# Patient Record
Sex: FEMALE | Race: ASIAN | NOT HISPANIC OR LATINO | Employment: FULL TIME | URBAN - METROPOLITAN AREA
[De-identification: names, ages, dates, MRNs, and addresses within clinical notes are randomized per-mention and may not be internally consistent; named-entity substitution may affect disease eponyms.]

---

## 2018-09-03 ENCOUNTER — TRANSFERRED RECORDS (OUTPATIENT)
Dept: HEALTH INFORMATION MANAGEMENT | Facility: CLINIC | Age: 23
End: 2018-09-03

## 2018-09-04 ENCOUNTER — COMMUNICATION - HEALTHEAST (OUTPATIENT)
Dept: ADMINISTRATIVE | Facility: CLINIC | Age: 23
End: 2018-09-04

## 2018-09-04 ENCOUNTER — HOSPITAL ENCOUNTER (OUTPATIENT)
Facility: CLINIC | Age: 23
Setting detail: OBSERVATION
Discharge: HOME | End: 2018-09-05
Attending: INTERNAL MEDICINE | Admitting: INTERNAL MEDICINE
Payer: MEDICAID

## 2018-09-04 DIAGNOSIS — E05.90 HYPERTHYROIDISM: Primary | ICD-10-CM

## 2018-09-04 LAB
ALBUMIN SERPL-MCNC: 2.7 G/DL (ref 3.4–5)
ALP SERPL-CCNC: 146 U/L (ref 40–150)
ALT SERPL W P-5'-P-CCNC: 39 U/L (ref 0–50)
ALT SERPL-CCNC: 32 U/L (ref 0–45)
ANION GAP SERPL CALCULATED.3IONS-SCNC: 7 MMOL/L (ref 3–14)
AST SERPL W P-5'-P-CCNC: 32 U/L (ref 0–45)
AST SERPL-CCNC: 26 U/L (ref 0–40)
BASOPHILS # BLD AUTO: 0 10E9/L (ref 0–0.2)
BASOPHILS NFR BLD AUTO: 0 %
BILIRUB SERPL-MCNC: 0.8 MG/DL (ref 0.2–1.3)
BUN SERPL-MCNC: 8 MG/DL (ref 7–30)
CALCIUM SERPL-MCNC: 9 MG/DL (ref 8.5–10.1)
CHLORIDE SERPL-SCNC: 114 MMOL/L (ref 94–109)
CO2 SERPL-SCNC: 20 MMOL/L (ref 20–32)
CREAT SERPL-MCNC: 0.43 MG/DL (ref 0.52–1.04)
CREAT SERPL-MCNC: 0.55 MG/DL (ref 0.6–1.1)
DIFFERENTIAL METHOD BLD: ABNORMAL
EOSINOPHIL # BLD AUTO: 0 10E9/L (ref 0–0.7)
EOSINOPHIL NFR BLD AUTO: 1 %
ERYTHROCYTE [DISTWIDTH] IN BLOOD BY AUTOMATED COUNT: 12.3 % (ref 10–15)
GFR SERPL CREATININE-BSD FRML MDRD: >60 ML/MIN/1.73M2
GFR SERPL CREATININE-BSD FRML MDRD: >90 ML/MIN/1.7M2
GLUCOSE SERPL-MCNC: 72 MG/DL (ref 70–99)
GLUCOSE SERPL-MCNC: 83 MG/DL (ref 70–125)
HCT VFR BLD AUTO: 37.7 % (ref 35–47)
HGB BLD-MCNC: 12.5 G/DL (ref 11.7–15.7)
IMM GRANULOCYTES # BLD: 0 10E9/L (ref 0–0.4)
IMM GRANULOCYTES NFR BLD: 0 %
INR PPP: 1.2 (ref 0.86–1.14)
IRON SATN MFR SERPL: 23 % (ref 15–46)
IRON SERPL-MCNC: 56 UG/DL (ref 35–180)
LACTATE BLD-SCNC: 0.9 MMOL/L (ref 0.7–2)
LYMPHOCYTES # BLD AUTO: 0.9 10E9/L (ref 0.8–5.3)
LYMPHOCYTES NFR BLD AUTO: 31.1 %
MAGNESIUM SERPL-MCNC: 1.7 MG/DL (ref 1.6–2.3)
MCH RBC QN AUTO: 26 PG (ref 26.5–33)
MCHC RBC AUTO-ENTMCNC: 33.2 G/DL (ref 31.5–36.5)
MCV RBC AUTO: 78 FL (ref 78–100)
MONOCYTES # BLD AUTO: 0.4 10E9/L (ref 0–1.3)
MONOCYTES NFR BLD AUTO: 13.7 %
NEUTROPHILS # BLD AUTO: 1.6 10E9/L (ref 1.6–8.3)
NEUTROPHILS NFR BLD AUTO: 54.2 %
NRBC # BLD AUTO: 0 10*3/UL
NRBC BLD AUTO-RTO: 0 /100
PHOSPHATE SERPL-MCNC: 4.6 MG/DL (ref 2.5–4.5)
PLATELET # BLD AUTO: 126 10E9/L (ref 150–450)
POTASSIUM SERPL-SCNC: 3.9 MMOL/L (ref 3.5–5)
POTASSIUM SERPL-SCNC: 4 MMOL/L (ref 3.4–5.3)
PROT SERPL-MCNC: 6.4 G/DL (ref 6.8–8.8)
RBC # BLD AUTO: 4.81 10E12/L (ref 3.8–5.2)
RETICS # AUTO: 55.2 10E9/L (ref 25–95)
RETICS/RBC NFR AUTO: 1.2 % (ref 0.5–2)
SODIUM SERPL-SCNC: 142 MMOL/L (ref 133–144)
T3FREE SERPL-MCNC: 12 PG/ML (ref 2.3–4.2)
TIBC SERPL-MCNC: 241 UG/DL (ref 240–430)
TSH SERPL-ACNC: <0.01 UIU/ML (ref 0.3–5)
WBC # BLD AUTO: 2.9 10E9/L (ref 4–11)

## 2018-09-04 PROCEDURE — 40000141 ZZH STATISTIC PERIPHERAL IV START W/O US GUIDANCE

## 2018-09-04 PROCEDURE — 36415 COLL VENOUS BLD VENIPUNCTURE: CPT | Performed by: PHYSICIAN ASSISTANT

## 2018-09-04 PROCEDURE — 40000611 ZZHCL STATISTIC MORPHOLOGY W/INTERP HEMEPATH TC 85060: Performed by: PHYSICIAN ASSISTANT

## 2018-09-04 PROCEDURE — 99223 1ST HOSP IP/OBS HIGH 75: CPT | Mod: AI | Performed by: HOSPITALIST

## 2018-09-04 PROCEDURE — 83605 ASSAY OF LACTIC ACID: CPT | Performed by: HOSPITALIST

## 2018-09-04 PROCEDURE — 84481 FREE ASSAY (FT-3): CPT | Performed by: PHYSICIAN ASSISTANT

## 2018-09-04 PROCEDURE — 99207 ZZC APP CREDIT; MD BILLING SHARED VISIT: CPT | Performed by: PHYSICIAN ASSISTANT

## 2018-09-04 PROCEDURE — 85025 COMPLETE CBC W/AUTO DIFF WBC: CPT | Performed by: PHYSICIAN ASSISTANT

## 2018-09-04 PROCEDURE — 83540 ASSAY OF IRON: CPT | Performed by: PHYSICIAN ASSISTANT

## 2018-09-04 PROCEDURE — 80053 COMPREHEN METABOLIC PANEL: CPT | Performed by: PHYSICIAN ASSISTANT

## 2018-09-04 PROCEDURE — 12000001 ZZH R&B MED SURG/OB UMMC

## 2018-09-04 PROCEDURE — 84445 ASSAY OF TSI GLOBULIN: CPT | Performed by: PHYSICIAN ASSISTANT

## 2018-09-04 PROCEDURE — 25000132 ZZH RX MED GY IP 250 OP 250 PS 637: Performed by: PHYSICIAN ASSISTANT

## 2018-09-04 PROCEDURE — 25000128 H RX IP 250 OP 636: Performed by: PHYSICIAN ASSISTANT

## 2018-09-04 PROCEDURE — 83735 ASSAY OF MAGNESIUM: CPT | Performed by: PHYSICIAN ASSISTANT

## 2018-09-04 PROCEDURE — 83550 IRON BINDING TEST: CPT | Performed by: PHYSICIAN ASSISTANT

## 2018-09-04 PROCEDURE — 84100 ASSAY OF PHOSPHORUS: CPT | Performed by: PHYSICIAN ASSISTANT

## 2018-09-04 PROCEDURE — 85610 PROTHROMBIN TIME: CPT | Performed by: PHYSICIAN ASSISTANT

## 2018-09-04 PROCEDURE — 85045 AUTOMATED RETICULOCYTE COUNT: CPT | Performed by: PHYSICIAN ASSISTANT

## 2018-09-04 PROCEDURE — 36415 COLL VENOUS BLD VENIPUNCTURE: CPT | Performed by: HOSPITALIST

## 2018-09-04 RX ORDER — ATENOLOL 25 MG/1
25 TABLET ORAL DAILY
Status: DISCONTINUED | OUTPATIENT
Start: 2018-09-04 | End: 2018-09-05

## 2018-09-04 RX ORDER — SODIUM CHLORIDE 9 MG/ML
INJECTION, SOLUTION INTRAVENOUS CONTINUOUS
Status: DISCONTINUED | OUTPATIENT
Start: 2018-09-04 | End: 2018-09-04

## 2018-09-04 RX ORDER — PANTOPRAZOLE SODIUM 40 MG/1
40 TABLET, DELAYED RELEASE ORAL
Status: DISCONTINUED | OUTPATIENT
Start: 2018-09-04 | End: 2018-09-05 | Stop reason: HOSPADM

## 2018-09-04 RX ORDER — BISACODYL 5 MG
5 TABLET, DELAYED RELEASE (ENTERIC COATED) ORAL DAILY PRN
Status: DISCONTINUED | OUTPATIENT
Start: 2018-09-04 | End: 2018-09-05 | Stop reason: HOSPADM

## 2018-09-04 RX ORDER — NICOTINE POLACRILEX 4 MG
15-30 LOZENGE BUCCAL
Status: DISCONTINUED | OUTPATIENT
Start: 2018-09-04 | End: 2018-09-05 | Stop reason: HOSPADM

## 2018-09-04 RX ORDER — SODIUM CHLORIDE, SODIUM LACTATE, POTASSIUM CHLORIDE, CALCIUM CHLORIDE 600; 310; 30; 20 MG/100ML; MG/100ML; MG/100ML; MG/100ML
INJECTION, SOLUTION INTRAVENOUS CONTINUOUS
Status: DISCONTINUED | OUTPATIENT
Start: 2018-09-04 | End: 2018-09-05

## 2018-09-04 RX ORDER — BISACODYL 5 MG
15 TABLET, DELAYED RELEASE (ENTERIC COATED) ORAL DAILY PRN
Status: DISCONTINUED | OUTPATIENT
Start: 2018-09-04 | End: 2018-09-05 | Stop reason: HOSPADM

## 2018-09-04 RX ORDER — ACETAMINOPHEN 325 MG/1
975 TABLET ORAL EVERY 8 HOURS PRN
Status: DISCONTINUED | OUTPATIENT
Start: 2018-09-04 | End: 2018-09-05

## 2018-09-04 RX ORDER — NALOXONE HYDROCHLORIDE 0.4 MG/ML
.1-.4 INJECTION, SOLUTION INTRAMUSCULAR; INTRAVENOUS; SUBCUTANEOUS
Status: DISCONTINUED | OUTPATIENT
Start: 2018-09-04 | End: 2018-09-05 | Stop reason: HOSPADM

## 2018-09-04 RX ORDER — DEXTROSE MONOHYDRATE 25 G/50ML
25-50 INJECTION, SOLUTION INTRAVENOUS
Status: DISCONTINUED | OUTPATIENT
Start: 2018-09-04 | End: 2018-09-05 | Stop reason: HOSPADM

## 2018-09-04 RX ORDER — AMOXICILLIN 250 MG
2 CAPSULE ORAL 2 TIMES DAILY
Status: DISCONTINUED | OUTPATIENT
Start: 2018-09-04 | End: 2018-09-05 | Stop reason: HOSPADM

## 2018-09-04 RX ORDER — ONDANSETRON 4 MG/1
4 TABLET, ORALLY DISINTEGRATING ORAL EVERY 6 HOURS PRN
Status: DISCONTINUED | OUTPATIENT
Start: 2018-09-04 | End: 2018-09-05 | Stop reason: HOSPADM

## 2018-09-04 RX ORDER — BISACODYL 5 MG
10 TABLET, DELAYED RELEASE (ENTERIC COATED) ORAL DAILY PRN
Status: DISCONTINUED | OUTPATIENT
Start: 2018-09-04 | End: 2018-09-05 | Stop reason: HOSPADM

## 2018-09-04 RX ORDER — DEXTROSE, SODIUM CHLORIDE, SODIUM LACTATE, POTASSIUM CHLORIDE, AND CALCIUM CHLORIDE 5; .6; .31; .03; .02 G/100ML; G/100ML; G/100ML; G/100ML; G/100ML
500 INJECTION, SOLUTION INTRAVENOUS ONCE
Status: COMPLETED | OUTPATIENT
Start: 2018-09-04 | End: 2018-09-04

## 2018-09-04 RX ORDER — LIDOCAINE 40 MG/G
CREAM TOPICAL
Status: DISCONTINUED | OUTPATIENT
Start: 2018-09-04 | End: 2018-09-05 | Stop reason: HOSPADM

## 2018-09-04 RX ORDER — AMOXICILLIN 250 MG
1 CAPSULE ORAL 2 TIMES DAILY
Status: DISCONTINUED | OUTPATIENT
Start: 2018-09-04 | End: 2018-09-05 | Stop reason: HOSPADM

## 2018-09-04 RX ORDER — ONDANSETRON 2 MG/ML
4 INJECTION INTRAMUSCULAR; INTRAVENOUS EVERY 6 HOURS PRN
Status: DISCONTINUED | OUTPATIENT
Start: 2018-09-04 | End: 2018-09-05 | Stop reason: HOSPADM

## 2018-09-04 RX ADMIN — ONDANSETRON 4 MG: 2 INJECTION INTRAMUSCULAR; INTRAVENOUS at 20:23

## 2018-09-04 RX ADMIN — ACETAMINOPHEN 975 MG: 325 TABLET, FILM COATED ORAL at 20:12

## 2018-09-04 RX ADMIN — SODIUM CHLORIDE, SODIUM LACTATE, POTASSIUM CHLORIDE, CALCIUM CHLORIDE AND DEXTROSE MONOHYDRATE 500 ML: 5; 600; 310; 30; 20 INJECTION, SOLUTION INTRAVENOUS at 20:13

## 2018-09-04 RX ADMIN — ATENOLOL 25 MG: 25 TABLET ORAL at 22:15

## 2018-09-04 RX ADMIN — SODIUM CHLORIDE, POTASSIUM CHLORIDE, SODIUM LACTATE AND CALCIUM CHLORIDE: 600; 310; 30; 20 INJECTION, SOLUTION INTRAVENOUS at 17:30

## 2018-09-04 ASSESSMENT — PAIN DESCRIPTION - DESCRIPTORS: DESCRIPTORS: HEADACHE

## 2018-09-04 ASSESSMENT — ACTIVITIES OF DAILY LIVING (ADL)
ADLS_ACUITY_SCORE: 17
ADLS_ACUITY_SCORE: 17

## 2018-09-04 NOTE — PROGRESS NOTES
[facility]  Transfer Triage Note    Date of call: 09/04/18  Time of call: 11:16 AM    Reason for Transfer:Further diagnostic work up, management, and consultation for specialized care  Diagnosis: Hyperthyroidism    Outside Records: Available  Additional records requested to be faxed to [phone].    Stability of Patient: Patient is vitally stable, with no critical labs, and will likely remain stable throughout the transfer process    Expected Time of Arrival for Transfer: 0-8 hours    Recommendations for Management and Stabilization: Not needed    Additional Comments   24 yo previously healthy female presented to [facility] ED with few week history of constitutional symptoms including fatigue, dark stools, low grade temps, palpitations. W/u revealed mostly normal labs however TSH is 0; T3 T4 levels are pending. Vitals: BP is wnl, Tachy in 110s. Non toxic appearing. Mental status is normal. Received propranolol and 2L fluid in the ED. Being transferred for evaluation by Endocrine here. They were consulted by ED over the phone prior to triage call and recommended transfer.    Kianna HILARIO MD  Triage Hospitalist

## 2018-09-04 NOTE — H&P
"[facility]    Internal Medicine History and Physical - Gold Service       Date of Admission:  9/4/2018    Assessment & Plan   Florentin Vega is a 23 year old female  admitted on 9/4/2018. She has PMH of Migraine w/o aura who presents in transfer from OSH ED where she presented with ~ 1 week hx of fatigue, dark stools, early satiety, low grade temps, and palpitations. OSH evaluation reveals new dx of hyperthyroidism, prompting transfer for Endocrine consultation. Patient admitted to Medicine for further care.     # Newly dx Hyperthyroidism: Patient presented to OSH ED with ~ 1 week hx of fatigue, headache, tremor in UE and LE, palpitations, dark stool, suprapubic pain, dysuria, nausea. Also reports ~ 10-15 pound unintentional weight loss in past couple months and hot flashes. ED w/u with HR in 120's, /73, RR 25. EKG with sinus tachycardia. TSH of less than 0.01, T4 3.1 and T3 229, leukopenia, thrombocytopenia. LFT's wnl, Glucose 83. FOBT negative, Hgb stable at 13.2. Non-contrast CT A/P w/o acute pathology. No chronic PTA medications. Has been taking ibuprofen and tylenol for the past week. Received 2L IVF, Toradol, Zofran, and Propranolol. Will likely need GOMEZ uptake study to further evaluate. No nodule or goiter on exam. , BP stable. No opthalmopathy, or per-tibial myxedema on exam.   - Endocrine consult placed. Discussed with this evening and plan for symptomatic treatment overnight. They will see patient in am   - Add TSI- Thyroid stimulating immunoglobulin  - CBC, CMP, UA, Mg, Phos  - IVF  - Zofran PRN for nausea  - Tylenol for pain  - Regular diet okay    # Suprapubic pain and dysuria: Sx present for past week. Also reports associated \"spotting\" on 9/2/18. Pain is dull and intermittent. UA and Urnie Hcg negative at OSH. Lipase, LFT's, Tbili- wnl. Mildly elevated . Non-contrast CT A/P w/o acute pathology. Possibly related to hyperthyroidism and polyuria. "   - Heating pad  - Further recs pending w/u on admission     # Microcytosis: Hgb 12.5, MCV 76. Reports recent heavy menstruation.   - Add Iron studis      # Leukopenia: WBC 3.4 at OSH. On admission 2.9. ANC 1.6. Most recent WBC 8.0 3/2015.   - Peripheral smear    # Thrombocytopenia: Platelets 126 on admission. 3/2015 platelets 268. Consider drug induced with recent ibuprofen use vs ITP w/ hyperthyroid dx.   - Peripheral smear  - CBC in am     # Migraine w/o aura: Since ~ 2010, though has not experienced HA in past 7 years. Reports significant intractable HA for past week that is changed from previous. Pain is located bi-temporal, between eyes and top of head. Patient has karlo taking tylenol and ibuprofen with mild alleviation in sx.   - Tylenol  - Avoid NSAIDS for now as patient concerned for developing PUD        # Pain Assessment:   - Florentin is experiencing pain due to suprapubic abdominal pain. Pain management was discussed and the plan was created in a collaborative fashion.  Florentin's response to the current recommendations: engaged  - Please see the plan for pain management as documented above        Diet: Regular Diet Adult  Fluids: LR @ 100 ml/hr  DVT Prophylaxis: Pneumatic Compression Devices  Code Status: Full Code    Disposition Plan   Expected discharge: 2 - 3 days; recommended to prior living arrangement once Endocrine cosult with possible intervention.     Entered: Libia Martinez 09/04/2018, 5:19 PM   Information in the above section will display in the discharge planner report.    The patient's care was discussed with the Attending Physician, Dr. Mg.    Libia Martinez PA-C  Internal Medicine Hospitalist Service  [facility]  Pager: [phone]    Please see sticky note for cross cover information  ______________________________________________________________________    Chief Complaint   Fatigue, palpitations, HA    History is obtained from the patient and  "EMR    History of Present Illness   Florentin Vega is a 23 year old female  With PMH of Migraine w/o aura who presented to OSH ED with ~ 1 week hx of fatigue, HA, dark stool, suprapubic pain and palpitations.     Patient reports that she has been in BL state of health until ~ 1 week ago when she developed extreme fatigue, intractable HA, nausea, palpitations, extreme hunger despite increased PO intake, dark stools, UE and LE tremor, anxiety and suprapubic pain. She has been taking tylenol and ibuprofen for the HA with mild relief. HA is reported as bi temporal w/ associated pain in between eyes and on top of head. Not similar to previous migraine HA. No vision changes, fever, or focal neurological changes.  Her appetite has significantly increased, though reports unintentional weight loss of 10-15 lbs in the past month or so and never feeling like she is full. Additionally, patient endorses dark stool (x 3 episodes- formed and no BRB, suprapubic pain and dysuria w/ small amount of \"spotting\". No hx of PUD, though has been taking large doses of ibuprofen for last week. No orthostatic sx.   Patient reports that she recently started back to College and with increase in stress, but does not think sx are related. She also reports that ~ 1 year ago she developed slight tremor in UE and LE, hot flashes, and anxiety. She did not seek medical attention for sx and has not taken any medication to alleviate sx. We discussed that she may have had mild sx related to Hyperthyroidism at that time and acute change in sx related to stress vs other. No family hx of thyroid disease. No other significant PMH or PTA medications.     Currently, patient very concerned about test results. We discussed plan as outlined above and patient agreeable.     Patient does not endorse:  changes in vision, chest pain,  upper respiratory symptoms of rhinorrhea or congestion, shortness of breath, cough, sputum production, wheezing, emesis, constipation, " diarrhea, dysuria, edema, rashes, weakness, focal neurologic deficits, recent travel, illness, fever, chills.      Review of Systems   The 10 point Review of Systems is negative other than noted in the HPI or here.     Past Medical History    I have reviewed this patient's medical history and updated it with pertinent information if needed.   Past Medical History:   Diagnosis Date     Chronic migraine w/o aura w/o status migrainosus, not intractable      Hyperthyroidism 09/04/2018        Past Surgical History   I have reviewed this patient's surgical history and updated it with pertinent information if needed.  No past surgical history on file.     Social History   Social History   Substance Use Topics     Smoking status: Never Smoker     Smokeless tobacco: Not on file     Alcohol use No       Family History   I have reviewed this patient's family history and updated it with pertinent information if needed.   Family History   Problem Relation Age of Onset     No Known Problems Mother      No Known Problems Father      No Known Problems Sister      No Known Problems Brother      No Known Problems Maternal Grandmother      No Known Problems Maternal Grandfather      No Known Problems Paternal Grandmother      No Known Problems Other        Prior to Admission Medications   None     Allergies   No Known Allergies    Physical Exam   Vital Signs: Temp: 96.6  F (35.9  C) Temp src: Oral BP: 117/81 Pulse: 107   Resp: 16 SpO2: 99 % O2 Device: None (Room air)    Weight: 117 lbs 4.8 oz    Physical Exam   Constitutional: Pleasant young female lying in bed. Friend at bedside. Soft spoken, though conversant.   Well nourished, well developed, resting comfortably   HEENT:   Head: Normocephalic and atraumatic.   Eyes: Conjunctivae are normal. Pupils are equal, round, and reactive to light.  Pharynx has no erythema or exudate, mucous membranes are moist  Neck:   No adenopathy, no bony tenderness. No goiter or nodule noted.    Cardiovascular: Tachycardia, though without murmurs or gallops  Pulmonary/Chest: Clear to auscultation bilaterally, with no wheezes or retractions. No respiratory distress.  GI: Soft with good bowel sounds. Mild TTP in suprapubic RLQ non-distended, with no guarding, no rebound, no peritoneal signs.   Back:  No bony or CVA tenderness   Musculoskeletal:  No edema or clubbing   Skin: Skin is warm and dry. No rash noted to exposed skin areas.   Neurological: Alert and oriented to person, place, and time. Nonfocal exam  Psychiatric:  Normal mood and affect.    Data   Data reviewed today: I reviewed all medications, new labs and imaging results over the last 24 hours. I personally reviewed recent imaging impressions, labs and progress notes from Care everywhere record.     Data     Recent Labs  Lab 09/04/18  1618   WBC 2.9*   HGB 12.5   MCV 78   *      POTASSIUM 4.0   CHLORIDE 114*   CO2 20   BUN 8   CR 0.43*   ANIONGAP 7   ALEJANDRA 9.0   GLC 72   ALBUMIN 2.7*   PROTTOTAL 6.4*   BILITOTAL 0.8   ALKPHOS 146   ALT 39   AST 32

## 2018-09-04 NOTE — IP AVS SNAPSHOT
MRN:[id number]                      After Visit Summary   9/4/2018    Florentin Vega    MRN: [id number]           Thank you!     Thank you for choosing [facility] for your care. Our goal is always to provide you with excellent care. Hearing back from our patients is one way we can continue to improve our services. Please take a few minutes to complete the written survey that you may receive in the mail after you visit with us. Thank you!        Patient Information     Date Of Birth          1995        Designated Caregiver       Most Recent Value    Caregiver    Will someone help with your care after discharge? no      About your hospital stay     You were admitted on:  September 4, 2018 You last received care in the:  [address] [facility]    You were discharged on:  September 5, 2018        Reason for your hospital stay       Admitted with hyperthyroidism and endocrinology consult                  Who to Call     For medical emergencies, please call 911.  For non-urgent questions about your medical care, please call your primary care provider or clinic, None          Attending Provider     Provider Specialty    Kianna Ayon MD Internal Medicine    Stella Mg MD Internal Medicine    Kaylen Valadez MD Internal Medicine       Primary Care Provider Fax #    Physician No Ref-Primary [phone]      After Care Instructions     Activity       Your activity upon discharge: activity as tolerated            Diet       Follow this diet upon discharge: Orders Placed This Encounter      Regular Diet Adult                  Follow-up Appointments     Adult [facility] Follow-up and recommended labs and tests       Follow up with primary care provider, Physician No Ref-Primary, within 7 days to evaluate medication change. Follow up for CBC is recommended  Follow up with endocrinology for next week with result of radiodine uptake scan   Follow up for radioiodine uptake scan on 9/6 and 9/7  "  Appointments on [place] and/or [facility] (with [facility] or [facility] provider or service). Call [phone] if you haven't heard regarding these appointments within 7 days of discharge.            Follow Up and recommended labs and tests       Follow up CBC in 1 week                  Additional Services     Endocrinology Adult Referral       Please have follow up with Dr Malone on 9/10                  Future tests that were ordered for you     NM Thyroid uptake and scan       For further evaluation of hyperthyroidism thyroid uptake scan ; please schedule for 9/6/2018                  Pending Results     Date and Time Order Name Status Description    9/5/2018 1131 Erythrocyte sedimentation rate auto In process     9/5/2018 1122 Haptoglobin In process     9/5/2018 0821 CMV DNA quantification In process     9/5/2018 0821 Adenovirus DNA QT PCR In process     9/5/2018 0821 Roseann Barr Virus Qualitative PCR In process     9/4/2018 1618 Thyroid stimulating immunoglobulin In process             Statement of Approval     Ordered          09/05/18 3549  I have reviewed and agree with all the recommendations and orders detailed in this document.  EFFECTIVE NOW     Approved and electronically signed by:  Kaylen Valadez MD             Admission Information     Date & Time Provider Department Dept. Phone    9/4/2018 Kaylen Valadez MD [address] [facility] [phone]      Your Vitals Were     Blood Pressure Pulse Temperature Respirations Height Weight    117/77 (BP Location: Left arm) 105 98.3  F (36.8  C) (Oral) 14 1.575 m (5' 2\") 53.2 kg (117 lb 4.8 oz)    Pulse Oximetry BMI (Body Mass Index)                96% 21.45 kg/m2          [facility] Information     [facility] lets you send messages to your doctor, view your test results, renew your prescriptions, schedule appointments and more. To sign up, go to [email or web address] . Click on \"Log in\" on the left side of the screen, which will take you to the Welcome " "page. Then click on \"Sign up Now\" on the right side of the page.     You will be asked to enter the access code listed below, as well as some personal information. Please follow the directions to create your username and password.     Your access code is: [email or web address]  Expires: 2018  6:26 PM     Your access code will  in 90 days. If you need help or a new code, please call your [facility] clinic or [phone].        Care EveryWhere ID     This is your Care EveryWhere ID. This could be used by other organizations to access your [facility] medical records  [id number]        Equal Access to Services     ABDI DEMPSEY : Theodore Collazo, nnamdi oliveira, jaleesa rao, jd barrientos. So [facility] [phone].    ATENCIÓN: Si habla español, tiene a treadwell disposición servicios gratuitos de asistencia lingüística. Llame al [phone].    We comply with applicable federal civil rights laws and [place] laws. We do not discriminate on the basis of race, color, national origin, age, disability, sex, sexual orientation, or gender identity.               Review of your medicines      START taking        Dose / Directions    propranolol 10 MG tablet   Commonly known as:  INDERAL        Dose:  10 mg   Take 1 tablet (10 mg) by mouth 3 times daily   Quantity:  60 tablet   Refills:  0         CONTINUE these medicines which have NOT CHANGED        Dose / Directions    acetaminophen 325 MG tablet   Commonly known as:  TYLENOL        Dose:  325-650 mg   Take 325-650 mg by mouth every 6 hours as needed for mild pain   Refills:  0       ibuprofen 200 MG tablet   Commonly known as:  ADVIL/MOTRIN        Dose:  200-400 mg   Take 200-400 mg by mouth every 4 hours as needed for mild pain   Refills:  0            Where to get your medicines      These medications were sent to [facility] [place], [place] - [address]  [address] [address] "     Phone:  [phone]     propranolol 10 MG tablet                Protect others around you: Learn how to safely use, store and throw away your medicines at www.disposemymeds.org.             Medication List: This is a list of all your medications and when to take them. Check marks below indicate your daily home schedule. Keep this list as a reference.      Medications           Morning Afternoon Evening Bedtime As Needed    acetaminophen 325 MG tablet   Commonly known as:  TYLENOL   Take 325-650 mg by mouth every 6 hours as needed for mild pain   Last time this was given:  975 mg on 9/4/2018  8:12 PM                                ibuprofen 200 MG tablet   Commonly known as:  ADVIL/MOTRIN   Take 200-400 mg by mouth every 4 hours as needed for mild pain                                propranolol 10 MG tablet   Commonly known as:  INDERAL   Take 1 tablet (10 mg) by mouth 3 times daily

## 2018-09-04 NOTE — LETTER
[address] [facility]  [address] [place] [address]  [phone]          September 5, 2018    RE:  Florentin HAZEL Her                                                                                                                                                       [address]  [place] [address]            To whom it may concern:    Florentin Vega is under my professional care for Hyperthyroidism . She was admitted in [facility] and [facility] from 9/3/2018 to 9/5/2018 for above reasons. She would need further clinic visit for above reasons after her hospital stay till 9/11/2018 and she is ok to go back to work after that with no limitations.   Please let us know if any further questions.     Sincerely,  Dr Kaylen Valadez MD    Department of General Internal Medicine   [facility]   Phone: [phone]

## 2018-09-04 NOTE — IP AVS SNAPSHOT
[address] [address]    Phone:  [phone]                                       After Visit Summary   9/4/2018    Florentin Vega    MRN: [id number]           After Visit Summary Signature Page     I have received my discharge instructions, and my questions have been answered. I have discussed any challenges I see with this plan with the nurse or doctor.    ..........................................................................................................................................  Patient/Patient Representative Signature      ..........................................................................................................................................  Patient Representative Print Name and Relationship to Patient    ..................................................               ................................................  Date                                            Time    ..........................................................................................................................................  Reviewed by Signature/Title    ...................................................              ..............................................  Date                                                            Time          22EPIC Rev 08/18

## 2018-09-05 ENCOUNTER — APPOINTMENT (OUTPATIENT)
Dept: CARDIOLOGY | Facility: CLINIC | Age: 23
End: 2018-09-05
Attending: INTERNAL MEDICINE
Payer: MEDICAID

## 2018-09-05 VITALS
RESPIRATION RATE: 14 BRPM | SYSTOLIC BLOOD PRESSURE: 117 MMHG | TEMPERATURE: 98.3 F | HEIGHT: 62 IN | OXYGEN SATURATION: 96 % | BODY MASS INDEX: 21.59 KG/M2 | DIASTOLIC BLOOD PRESSURE: 77 MMHG | HEART RATE: 105 BPM | WEIGHT: 117.3 LBS

## 2018-09-05 LAB
ALBUMIN SERPL-MCNC: 2.6 G/DL (ref 3.4–5)
ALBUMIN SERPL-MCNC: 2.7 G/DL (ref 3.4–5)
ALBUMIN UR-MCNC: NEGATIVE MG/DL
ALP SERPL-CCNC: 141 U/L (ref 40–150)
ALP SERPL-CCNC: 161 U/L (ref 40–150)
ALT SERPL W P-5'-P-CCNC: 40 U/L (ref 0–50)
ALT SERPL W P-5'-P-CCNC: 46 U/L (ref 0–50)
ANION GAP SERPL CALCULATED.3IONS-SCNC: 6 MMOL/L (ref 3–14)
APPEARANCE UR: CLEAR
AST SERPL W P-5'-P-CCNC: 26 U/L (ref 0–45)
AST SERPL W P-5'-P-CCNC: 34 U/L (ref 0–45)
BASOPHILS # BLD AUTO: 0 10E9/L (ref 0–0.2)
BASOPHILS NFR BLD AUTO: 0 %
BILIRUB DIRECT SERPL-MCNC: 0.1 MG/DL (ref 0–0.2)
BILIRUB SERPL-MCNC: 0.3 MG/DL (ref 0.2–1.3)
BILIRUB SERPL-MCNC: 0.6 MG/DL (ref 0.2–1.3)
BILIRUB UR QL STRIP: NEGATIVE
BUN SERPL-MCNC: 7 MG/DL (ref 7–30)
CALCIUM SERPL-MCNC: 9 MG/DL (ref 8.5–10.1)
CHLORIDE SERPL-SCNC: 112 MMOL/L (ref 94–109)
CO2 SERPL-SCNC: 25 MMOL/L (ref 20–32)
COLOR UR AUTO: NORMAL
COPATH REPORT: NORMAL
CREAT SERPL-MCNC: 0.49 MG/DL (ref 0.52–1.04)
CRP SERPL-MCNC: 3.7 MG/L (ref 0–8)
DIFFERENTIAL METHOD BLD: ABNORMAL
EOSINOPHIL # BLD AUTO: 0.1 10E9/L (ref 0–0.7)
EOSINOPHIL NFR BLD AUTO: 1.9 %
ERYTHROCYTE [DISTWIDTH] IN BLOOD BY AUTOMATED COUNT: 12.3 % (ref 10–15)
ERYTHROCYTE [SEDIMENTATION RATE] IN BLOOD BY WESTERGREN METHOD: 15 MM/H (ref 0–20)
FERRITIN SERPL-MCNC: 106 NG/ML (ref 12–150)
FOLATE SERPL-MCNC: 15.8 NG/ML
GFR SERPL CREATININE-BSD FRML MDRD: >90 ML/MIN/1.7M2
GLUCOSE SERPL-MCNC: 92 MG/DL (ref 70–99)
GLUCOSE UR STRIP-MCNC: NEGATIVE MG/DL
HAV IGM SERPL QL IA: NONREACTIVE
HBV SURFACE AB SERPL IA-ACNC: >1000 M[IU]/ML
HBV SURFACE AG SERPL QL IA: NONREACTIVE
HCT VFR BLD AUTO: 37.1 % (ref 35–47)
HCV AB SERPL QL IA: NONREACTIVE
HGB BLD-MCNC: 12.4 G/DL (ref 11.7–15.7)
HGB UR QL STRIP: NEGATIVE
HIV 1+2 AB+HIV1 P24 AG SERPL QL IA: NONREACTIVE
IMM GRANULOCYTES # BLD: 0 10E9/L (ref 0–0.4)
IMM GRANULOCYTES NFR BLD: 0 %
KETONES UR STRIP-MCNC: NEGATIVE MG/DL
LDH SERPL L TO P-CCNC: 143 U/L (ref 81–234)
LEUKOCYTE ESTERASE UR QL STRIP: NEGATIVE
LYMPHOCYTES # BLD AUTO: 1 10E9/L (ref 0.8–5.3)
LYMPHOCYTES NFR BLD AUTO: 36.1 %
MCH RBC QN AUTO: 26.3 PG (ref 26.5–33)
MCHC RBC AUTO-ENTMCNC: 33.4 G/DL (ref 31.5–36.5)
MCV RBC AUTO: 79 FL (ref 78–100)
MONOCYTES # BLD AUTO: 0.4 10E9/L (ref 0–1.3)
MONOCYTES NFR BLD AUTO: 14.9 %
NEUTROPHILS # BLD AUTO: 1.3 10E9/L (ref 1.6–8.3)
NEUTROPHILS NFR BLD AUTO: 47.1 %
NITRATE UR QL: NEGATIVE
NRBC # BLD AUTO: 0 10*3/UL
NRBC BLD AUTO-RTO: 0 /100
PH UR STRIP: 7 PH (ref 5–7)
PLATELET # BLD AUTO: 125 10E9/L (ref 150–450)
POTASSIUM SERPL-SCNC: 4.4 MMOL/L (ref 3.4–5.3)
PROT SERPL-MCNC: 6.3 G/DL (ref 6.8–8.8)
PROT SERPL-MCNC: 6.6 G/DL (ref 6.8–8.8)
RBC # BLD AUTO: 4.72 10E12/L (ref 3.8–5.2)
RBC #/AREA URNS AUTO: <1 /HPF (ref 0–2)
SODIUM SERPL-SCNC: 142 MMOL/L (ref 133–144)
SOURCE: NORMAL
SP GR UR STRIP: 1 (ref 1–1.03)
T4 FREE SERPL-MCNC: 4.44 NG/DL (ref 0.76–1.46)
TSH SERPL DL<=0.005 MIU/L-ACNC: <0.01 MU/L (ref 0.4–4)
UROBILINOGEN UR STRIP-MCNC: NORMAL MG/DL (ref 0–2)
VIT B12 SERPL-MCNC: 769 PG/ML (ref 193–986)
WBC # BLD AUTO: 2.7 10E9/L (ref 4–11)
WBC #/AREA URNS AUTO: <1 /HPF (ref 0–5)

## 2018-09-05 PROCEDURE — G0499 HEPB SCREEN HIGH RISK INDIV: HCPCS | Performed by: INTERNAL MEDICINE

## 2018-09-05 PROCEDURE — 25000128 H RX IP 250 OP 636: Performed by: PHYSICIAN ASSISTANT

## 2018-09-05 PROCEDURE — 86803 HEPATITIS C AB TEST: CPT | Performed by: INTERNAL MEDICINE

## 2018-09-05 PROCEDURE — 93306 TTE W/DOPPLER COMPLETE: CPT | Mod: 26 | Performed by: INTERNAL MEDICINE

## 2018-09-05 PROCEDURE — 25000132 ZZH RX MED GY IP 250 OP 250 PS 637: Performed by: PHYSICIAN ASSISTANT

## 2018-09-05 PROCEDURE — 84443 ASSAY THYROID STIM HORMONE: CPT | Performed by: PHYSICIAN ASSISTANT

## 2018-09-05 PROCEDURE — 25000132 ZZH RX MED GY IP 250 OP 250 PS 637: Performed by: INTERNAL MEDICINE

## 2018-09-05 PROCEDURE — 83615 LACTATE (LD) (LDH) ENZYME: CPT | Performed by: INTERNAL MEDICINE

## 2018-09-05 PROCEDURE — 87798 DETECT AGENT NOS DNA AMP: CPT | Performed by: INTERNAL MEDICINE

## 2018-09-05 PROCEDURE — 99217 ZZC OBSERVATION CARE DISCHARGE: CPT | Performed by: INTERNAL MEDICINE

## 2018-09-05 PROCEDURE — 86140 C-REACTIVE PROTEIN: CPT | Performed by: INTERNAL MEDICINE

## 2018-09-05 PROCEDURE — 85025 COMPLETE CBC W/AUTO DIFF WBC: CPT | Performed by: PHYSICIAN ASSISTANT

## 2018-09-05 PROCEDURE — 86709 HEPATITIS A IGM ANTIBODY: CPT | Performed by: INTERNAL MEDICINE

## 2018-09-05 PROCEDURE — 83010 ASSAY OF HAPTOGLOBIN QUANT: CPT | Performed by: INTERNAL MEDICINE

## 2018-09-05 PROCEDURE — 81001 URINALYSIS AUTO W/SCOPE: CPT | Performed by: INTERNAL MEDICINE

## 2018-09-05 PROCEDURE — 87799 DETECT AGENT NOS DNA QUANT: CPT | Mod: XU | Performed by: INTERNAL MEDICINE

## 2018-09-05 PROCEDURE — 93306 TTE W/DOPPLER COMPLETE: CPT

## 2018-09-05 PROCEDURE — 85652 RBC SED RATE AUTOMATED: CPT | Performed by: INTERNAL MEDICINE

## 2018-09-05 PROCEDURE — 82728 ASSAY OF FERRITIN: CPT | Performed by: INTERNAL MEDICINE

## 2018-09-05 PROCEDURE — 86706 HEP B SURFACE ANTIBODY: CPT | Performed by: INTERNAL MEDICINE

## 2018-09-05 PROCEDURE — 84439 ASSAY OF FREE THYROXINE: CPT | Performed by: PHYSICIAN ASSISTANT

## 2018-09-05 PROCEDURE — 87389 HIV-1 AG W/HIV-1&-2 AB AG IA: CPT | Performed by: INTERNAL MEDICINE

## 2018-09-05 PROCEDURE — 82607 VITAMIN B-12: CPT | Performed by: INTERNAL MEDICINE

## 2018-09-05 PROCEDURE — 36415 COLL VENOUS BLD VENIPUNCTURE: CPT | Performed by: PHYSICIAN ASSISTANT

## 2018-09-05 PROCEDURE — G0378 HOSPITAL OBSERVATION PER HR: HCPCS

## 2018-09-05 PROCEDURE — 82746 ASSAY OF FOLIC ACID SERUM: CPT | Performed by: INTERNAL MEDICINE

## 2018-09-05 PROCEDURE — 36415 COLL VENOUS BLD VENIPUNCTURE: CPT | Performed by: INTERNAL MEDICINE

## 2018-09-05 PROCEDURE — 40000556 ZZH STATISTIC PERIPHERAL IV START W US GUIDANCE

## 2018-09-05 PROCEDURE — 80053 COMPREHEN METABOLIC PANEL: CPT | Performed by: PHYSICIAN ASSISTANT

## 2018-09-05 PROCEDURE — 80076 HEPATIC FUNCTION PANEL: CPT | Performed by: INTERNAL MEDICINE

## 2018-09-05 RX ORDER — PROPRANOLOL HYDROCHLORIDE 10 MG/1
10 TABLET ORAL 3 TIMES DAILY
Qty: 60 TABLET | Refills: 0 | Status: SHIPPED | OUTPATIENT
Start: 2018-09-05

## 2018-09-05 RX ORDER — PROPRANOLOL HYDROCHLORIDE 10 MG/1
10 TABLET ORAL 3 TIMES DAILY
Status: DISCONTINUED | OUTPATIENT
Start: 2018-09-05 | End: 2018-09-05 | Stop reason: HOSPADM

## 2018-09-05 RX ORDER — IBUPROFEN 200 MG
200-400 TABLET ORAL EVERY 4 HOURS PRN
COMMUNITY

## 2018-09-05 RX ORDER — ACETAMINOPHEN 325 MG/1
325-650 TABLET ORAL EVERY 6 HOURS PRN
COMMUNITY

## 2018-09-05 RX ADMIN — SENNOSIDES AND DOCUSATE SODIUM 1 TABLET: 8.6; 5 TABLET ORAL at 09:42

## 2018-09-05 RX ADMIN — PANTOPRAZOLE SODIUM 40 MG: 40 TABLET, DELAYED RELEASE ORAL at 09:42

## 2018-09-05 RX ADMIN — SODIUM CHLORIDE, POTASSIUM CHLORIDE, SODIUM LACTATE AND CALCIUM CHLORIDE: 600; 310; 30; 20 INJECTION, SOLUTION INTRAVENOUS at 04:31

## 2018-09-05 RX ADMIN — ACETAMINOPHEN, ASPIRIN AND CAFFEINE 1 TABLET: 250; 250; 65 TABLET, FILM COATED ORAL at 12:27

## 2018-09-05 ASSESSMENT — ACTIVITIES OF DAILY LIVING (ADL)
ADLS_ACUITY_SCORE: 11

## 2018-09-05 NOTE — PLAN OF CARE
Problem: Patient Care Overview  Goal: Plan of Care/Patient Progress Review  Outcome: No Change  Pt from [facility] with migraine and new dx of hyperthyroid. Pt is A&O. VSS on RA with tachycarida and tachypnea. Atenolol started for HR. Lactate 0.9. Pt reports dizziness and is an assist of 1 d/t being unsteady, family assisting pt to BR. Pain reported includes migraine HA, heat and tylenol were given and pt felt better 1-2 hours after. C/o nausea w/ HA, zofran given IV. LR @ 100/hr in R PIV, 500ml D5LR bolus done. Pt was keeping arm straight d/t  PIV causing pain, then she reported numbness of arm. A new PIV was ordered. Admission started but unable to finish. Many visitors this evening. Endocrine will see in AM. Continue with POC.

## 2018-09-05 NOTE — UTILIZATION REVIEW
[facility]   Admission Status; Secondary Review Determination     Admission Date: 9/4/2018  3:21 PM  Date of Review: 9/5/2018     Under the authority of the Utilization Management Committee, the utilization review process indicated a secondary review on the above patient.  The review outcome is based on review of the medical records, discussions with staff, and applying clinical experience noted on the date of the review.       (x) Observation Status Appropriate - This patient does not meet hospital inpatient criteria and is placed in observation status.    RATIONALE FOR DETERMINATION     23 year old female presents with new diagnosis of hyperthyroidism associated with tachycardia in the 120's, weight loss, headache, mild thrombocytopenia, mild leucopenia, suprapubic pain an dysuria, and is hospitalized for further work up, treatment and evaluation with endocrinology consultation. Patient does have tachycardia but is otherwise hemodynamically stable. The severity of illness, intensity of service provided, expected LOS and risk for adverse outcome make the care appropriate for further observation; however, doesn't meet criteria for hospital inpatient admission. Discussed with Attending, Dr. Valadez, who concurs with observation.       The information on this document is developed by the utilization review team in order for the business office to ensure compliance.  This only denotes the appropriateness of proper admission status and does not reflect the quality of care rendered.         The definitions of Inpatient Status and Observation Status used in making the determination above are those provided in the CMS Coverage Manual, Chapter 1 and Chapter 6, section 70.4.      Sincerely,     Huber San MD    Physician Advisor - Utilization Review  [facility].

## 2018-09-05 NOTE — PLAN OF CARE
Problem: Patient Care Overview  Goal: Plan of Care/Patient Progress Review  Outcome: Adequate for Discharge Date Met: 09/05/18  Pt A&O, VSS on room air. Up independently. Here for hyperthyroidism workup, seen by IM team and endocrine today. Plan for nuc med scan of thyroid as outpatient. Nuc med called this RN to notify that scan will likely not take place until next week. Discharge orders signed, AVS discussed with pt. Plan to  new prescription at discharge pharmacy upon discharge. Belongings packed and sent with patient, ride provided by family who took pt off floor via wc. PIV removed. Plans to establish PCP and follow up with endocrine as outpatient.

## 2018-09-05 NOTE — PLAN OF CARE
OBSERVATION GOALS  1. Symptoms better: Not met - Pt reports constipation, denies other symptoms at this time.  2. Work up for hyperthyroidism: Not met - Nuclear Med Thyroid scan scheduled for Friday, needs pre-medication tomorrow 9/6.

## 2018-09-05 NOTE — PHARMACY-ADMISSION MEDICATION HISTORY
Pharmacy Admission Medication History    Admission medication history interview status for the 9/4/2018 admission is complete.   See EPIC admission navigator for allergy information, prior to admission medications and immunization status.   Medication history interview source(s): Patient  Medication history resources (including written lists, pill bottles, clinic record): None  Medication history source reliability: Good    Actions taken by pharmacist (provider contacted, medication changes, etc):None     Changes made to medication history:Both meds added    Additional medication history information: None    Medication reconciliation/reorder completed by provider prior to medication history? Yes    Time spent in this activity: 10 minutes      Prior to Admission medications    Medication Sig Last Dose Taking? Auth Provider   acetaminophen (TYLENOL) 325 MG tablet Take 325-650 mg by mouth every 6 hours as needed for mild pain 9/4/2018 at Unknown time Yes Unknown, Entered By History   ibuprofen (ADVIL/MOTRIN) 200 MG tablet Take 200-400 mg by mouth every 4 hours as needed for mild pain 9/4/2018 at Unknown time Yes Unknown, Entered By History

## 2018-09-05 NOTE — PROGRESS NOTES
Social Work Services Progress Note    Hospital Day: 2  Date of Initial Social Work Evaluation:  DANIA  Collaborated with:  tru Singleton RN; patient; pt's ; female visitor    Data:  On-call ALEXANDRE paged as pt has questions/concerns about being in Observation status.  Pt has decided to discharge this evening.    Intervention:  Chart reviewed.  ALEXANDRE met with pt, her  and another female visitor.  Pt tells me she was given the Observation letter and is concerned about her insurance not covering the stay or medicines.  Pt tells me she is on Medicare.  SW clarified that according to our system, pt is on Medicaid, not Medicare.  SW printed off information about what is and what is not covered under Medicaid from the [place] Department of Human Services and gave to the patient.  Reviewed that [place] typically covers more than Medicare in Observation stays, but to be sure pt can call the number on her insurance card or a worker at the Dept of Human Services to discuss further.  Pt's friend asked if there are any other programs to help pay for large hospital bills.  SW stated there still may be the Community Care program, but info re: this program can be received on billing statements or by calling the Billing department.    Pt asks if her MD's will complete FMLA paperwork.  Pt does not have the paperwork at this time, but wonders if she can come back to the hospital to bring this paperwork to the MD's when she gets it.  Pt's  tells writer that he plans on taking a Family Medical Leave Act in order to support pt while she is recovering (so the paperwork is for the 's FMLA).  ALEXANDRE explained that it would be best for pt/ to bring this paperwork to pt's PCP for completion.  Pt states she does not have a PCP.  SW encouraged pt to establish care with a PCP immediately after discharge.      ALEXANDRE updated JASIEL Singleton.      Assessment:  Questions answered to the best of writer's ability re: Obs status and FMLA  paperwork.    Plan:    Anticipated Disposition:  Home, no needs identified    Barriers to d/c plan:  None.    Follow Up:  On-call SW available until midnight.      KIKE Whipple  Social Work Services  Emergency Department   [phone] phone  [phone] pager  On-call pager, [phone], 1600 to midnight

## 2018-09-05 NOTE — CONSULTS
Endocrinology Medical Student/ Resident / Fellow note    Chief complaint:  Florentin is a 23 year old female seen in consultation at the request of Libia Martinez PA-C for new diagnosis of hyperthyroidism.       HISTORY OF PRESENT ILLNESS  The patient is a 23 year old previously healthy female who was admitted from the [facility] ED for a new diagnosis of hyperthyroidism. She reports intermittent symptoms of fatigue, headaches, and palpitations for the past year, but notes that they have become worse in the past 2 weeks. Her most bothersome symptom is the headaches. In the past she the headaches always improved after vomiting and she was able to treat these successfully with tylenol, but now she gets very little relief with either of these. She has also been experiencing full body weakness, hand tremors, feeling hot and cold. She has lost about 10 pounds during this time. She also reports dark stools, abdominal/suprapubic pain, and hematuria for the past 2-3 days. She denies blurred vision but does endorse diplopia when tracking objects. These symptoms prompted her to seek care at the [facility] ED on 9/4/18. In the ED, her TSH was <0.01 and free T4 was 3.1. She also had a mildly elevated alkaline phosphatase of 155 with normal AST, ALT, and bilirubin. She was also tachycardic in the 120s. She received IV fluids and propranolol in the ED and was subsequently transferred to our facility for admission and further work-up.     She reports minimal improvement in her symptoms since her admission. She believes the propranolol yesterday helped some.     She denies any personal or family history of thyroid disorders, autoimmune disorders, or type 1 diabetes.     She denies chest pain, shortness of breath, fevers, diarrhea, constipation, numbness or tingling in her extremities, or skin rashes.       REVIEW OF SYSTEMS    10 system ROS otherwise as per the HPI or negative    Past Medical History  Past Medical History:  "  Diagnosis Date     Chronic migraine w/o aura w/o status migrainosus, not intractable      Hyperthyroidism 09/04/2018       Medications  Current Facility-Administered Medications   Medication     aspirin-acetaminophen-caffeine (EXCEDRIN MIGRAINE) per tablet 1 tablet     atenolol (TENORMIN) tablet 25 mg     bisacodyl (DULCOLAX) EC tablet 5 mg    Or     bisacodyl (DULCOLAX) EC tablet 10 mg    Or     bisacodyl (DULCOLAX) EC tablet 15 mg     glucose gel 15-30 g    Or     dextrose 50 % injection 25-50 mL    Or     glucagon injection 1 mg     lactated ringers infusion     lidocaine (LMX4) cream     lidocaine 1 % 1 mL     melatonin tablet 1 mg     naloxone (NARCAN) injection 0.1-0.4 mg     ondansetron (ZOFRAN-ODT) ODT tab 4 mg    Or     ondansetron (ZOFRAN) injection 4 mg     pantoprazole (PROTONIX) EC tablet 40 mg     senna-docusate (SENOKOT-S;PERICOLACE) 8.6-50 MG per tablet 1 tablet    Or     senna-docusate (SENOKOT-S;PERICOLACE) 8.6-50 MG per tablet 2 tablet     sodium chloride (PF) 0.9% PF flush 3 mL     sodium chloride (PF) 0.9% PF flush 3 mL     traMADol (ULTRAM) half-tab 25 mg       No current outpatient prescriptions on file.       Allergies  No Known Allergies      Family History  family history includes No Known Problems in her brother, father, maternal grandfather, maternal grandmother, mother, paternal grandmother, sister, and another family member.    Social History  Social History   Substance Use Topics     Smoking status: Never Smoker     Smokeless tobacco: Not on file     Alcohol use No       Physical Exam  BP 94/65  Pulse 105  Temp 96.3  F (35.7  C) (Oral)  Resp 16  Ht 1.575 m (5' 2\")  Wt 53.2 kg (117 lb 4.8 oz)  SpO2 99%  BMI 21.45 kg/m2  Body mass index is 21.45 kg/(m^2).  GENERAL: In no apparent distress, appears comfortable.   SKIN: Skin is warm, moist and clammy. Skin color is normal and there are no rashes visualized. No alopecia noted.   EYES: PERRL, EOMI, No scleral icterus,  No proptosis, " conjunctival redness, or retraction. Question mild lid lag.   MOUTH: Moist, pink; pharynx clear  NECK: No visible masses. No palpable adenopathy. No carotid bruits. THYROID:  Mild diffuse enlargement, nontender, smooth texture,  no nodules, no Bruit.   RESP: Lungs clear to auscultation bilaterally  CARDIAC: Tachycardic with regular rhythm, without murmurs, rubs or gallops   ABDOMEN: Normal bowel sounds; soft, non-distended. Moderate tenderness to palpation around the umbilicus, moderate suprapubic tenderness that is greater on the right. No masses palpated.       NEURO: awake, alert, responds appropriately to questions. Moves all extremities; Gait normal.  No tremor of the outstretched hand.  DTRs 1+.    EXTREMITIES: No myxedema or skin changes.     DATA REVIEW  Labs were reviewed in Epic. Remarkable for:    09/05/18  WBC - 2.7  Hgb - 12.4  Plt - 125  CMP - pending  TSH - pending  TSI - pending  Hep A - negative  Hep B - immunized, no infection  Hep C - negative  HIV - negative  UA - negative for blood or infection    09/04/18  WBC - 2.9  Hgb - 12.5  Plt - 126  Alkaline phosphatase - 146  INR - 1.2  Iron studies - wnl  Blood smear - pending       ASSESSMENT/PLAN:   Florentin Vega is a previously healthy 23 year old female who is admitted with a new diagnosis of hyperthyroidism. The etiology of her hyperthyroidism is unknown at this time, but we suspect that this is most likely Grave's disease. Other causes on the differential include toxic adenoma and thyroiditis. At this time we recommend treating her symptoms with propranolol and proceeding with a radioactive iodine uptake scan. Her leukopenia and thrombocytopenia are also likely secondary to the hyperthyroidism and we suspect that these will improve with treatment of the hyperthyroidism. Her alkaline phosphatase is now within normal limits, but hyperthyroidism can cause elevations in this.     1. Hyperthyroidism, unknown etiology  -Radioactive iodine uptake scan,  plan for dosing tomorrow and imaging 24 hours later  -Propranolol 10 mg TID, may increase to 20 mg TID if tolerating well  -TSI pending  -Follow-up as outpatient next week for further discussion of scan results and possible treatment with methimazole vs iodine ablation      The patient was seen and discussed with Dr. Sheffield.     Gricelda Quesada (Olman), MS4  [employer] [facility] [employer]      Endocrine Staff Note    This 23 year old woman was seen and examined by me with medical student Gricelda Quesada.  Please see student's note above  for full details.  In brief, the patient has lost weight, felt weak, and had worsening migraines.  She has no family history of thyroid disease.    On my exam:  NAD lying in bed  Eyes - no periorbital edema, conjunctival injection, scleral icterus. EOM full w/o diplopia.  ? Lid lag  Neck -thyroid gland is about 1.5 times normal in size, diffusely enlarged w/o nodules.  No thyroid bruit heard  CV - RRR. No edema  Neuro -   DTR 2/4 biceps  Skin - ? Moist skin.  No evidence of pre tibial myxedema  Mood - unhappy, in pain.    Assessment/Plan:    Ms. Her has clinical and biochemical evidence of hyperthyroidism.  Most likely cause is Graves disease, but ddx includes thyroiditis, toxic adenoma.  Recommend a radioactive iodine uptake and scan be done to determine diagnosis.  Reviewed potential therapies for each of these diagnoses with her briefly and will plan to spend more time discussing therapy when diagnosis is established.    Agree with plan to switch to propranolol with rapid advancement of dose to 40 tid as tolerated.    We will follow with you and help with discharge planning.    Jackelyn Sheffield MD

## 2018-09-05 NOTE — PLAN OF CARE
Problem: Patient Care Overview  Goal: Plan of Care/Patient Progress Review  Outcome: No Change  Alert and oriented x 4. Had pain in the iv site and it was discontinued and hot pack applied. Complained of dizziness and was not having migraine when assessed. IV at 100 ml/hr Up with SBA to the BR. Significant other at bedside. Endocrine to see patient today.

## 2018-09-05 NOTE — PROGRESS NOTES
[facility], [place]    Internal Medicine Progress Note - Gold Service      Assessment & Plan   Florentin Vega is a 23 year old female admitted on 9/4/2018. She has PMH of Migraine w/o aura who presents from OSH ED with 1 week h/o fatigue, dark stools, early satiety, chills, hot flashes and palpitations -lab findings consistent with hyperthyroidism, concern for graves' disease     #Hyperthyroidism:  TSH <0.01, FT4- 3.1, FT3- 229   Pending TSI, inflammatory markers (for thyroiditis)  -endocrine consult pending and question if need to pursue GOMEZ uptake scan   -discontinue atenolol which has more B1 selective effect and started on propanolol for peripheral effects ; at lower dose of 10 mg PO TID -increase to 20 mg PO TID in 2 days   -continue with IV hydration and excedrin for symptomatic management of headache   -other viral work up- adenovirus, EBV, hepatitis panel, CMV pending   -pt complaining of shortness of breath with exertion- echo to r/o hyperthyroidism induced cardiomyopathy     #Pancytopenia:  -some rare case reports seen in setting of Graves' disease   -pending work up with LDH, haptoglobin, peripheral smear   -retics not suggestive of hemolysis or destruction process     #Migraine w/o aura:  Aggravated likely in setting of hyperthyroidism   -excedrin as needed for migraine              # Pain Assessment:  Current Pain Score 9/5/2018   Patient currently in pain? yes   Pain location Head   Pain descriptors Headache   - Florentin is experiencing pain due to migraine. Pain management was discussed and the plan was created in a collaborative fashion.  Florentin's response to the current recommendations: engaged  - Please see the plan for pain management as documented above        Diet: Regular Diet Adult  Fluids: IVF  DVT Prophylaxis: Low Risk/Ambulatory with no VTE prophylaxis indicated  Code Status: Full Code    Disposition Plan   Expected discharge: Tomorrow, recommended to prior  living arrangement once adequate pain management/ tolerating PO medications.     Entered: Kaylen Valadez 09/05/2018, 3:58 PM   Information in the above section will display in the discharge planner report.      The patient's care was discussed with the Bedside Nurse.    Kaylen Valadez  Internal Medicine Staff Hospitalist Service  [facility]  Pager: [phone]  Please see sticky note for cross cover information    Interval History   Pt complaining of blurring of vision and headache, ongoing hot flashes       Data reviewed today: I reviewed all medications, new labs and imaging results over the last 24 hours.  Physical Exam   Vital Signs: Temp: 98.3  F (36.8  C) Temp src: Oral BP: 117/77 Pulse: 105 Heart Rate: 99 Resp: 14 SpO2: 96 % O2 Device: None (Room air)    Weight: 117 lbs 4.8 oz  General Appearance: Pt looks comfortable on exam , no evidence of exophthalmos  Neck: non tender but diffusely enlarged thyroid gland   Respiratory: b/l equal breath sounds with no added sounds   Cardiovascular: s1s2 with no murmur   GI: soft, non tender, bowel sounds noted   Skin: no skin rashes   Other: AAOx3, b/l UE and LE-5/5; EOM intact but complaining of blurring of vision   Extremity: no tremor, no lower extremity swelling         Data   Data     Recent Labs  Lab 09/05/18  0737 09/04/18  1735 09/04/18  1618   WBC 2.7*  --  2.9*   HGB 12.4  --  12.5   MCV 79  --  78   *  --  126*   INR  --  1.20*  --      --  142   POTASSIUM 4.4  --  4.0   CHLORIDE 112*  --  114*   CO2 25  --  20   BUN 7  --  8   CR 0.49*  --  0.43*   ANIONGAP 6  --  7   ALEJANDRA 9.0  --  9.0   GLC 92  --  72   ALBUMIN 2.6*  --  2.7*   PROTTOTAL 6.3*  --  6.4*   BILITOTAL 0.6  --  0.8   ALKPHOS 141  --  146   ALT 40  --  39   AST 26  --  32

## 2018-09-06 ENCOUNTER — TELEPHONE (OUTPATIENT)
Dept: ENDOCRINOLOGY | Facility: CLINIC | Age: 23
End: 2018-09-06

## 2018-09-06 ENCOUNTER — PATIENT OUTREACH (OUTPATIENT)
Dept: CARE COORDINATION | Facility: CLINIC | Age: 23
End: 2018-09-06

## 2018-09-06 LAB — HAPTOGLOB SERPL-MCNC: 109 MG/DL (ref 15–200)

## 2018-09-06 NOTE — TELEPHONE ENCOUNTER
To schedulers : please schedule with consult service (or open AGUS) within the week.    Ebony Marina MD  Endocrine triage

## 2018-09-06 NOTE — TELEPHONE ENCOUNTER
----- Message from Emilie Garcia MD sent at 9/5/2018 11:35 PM CDT -----  Regarding: Inpatient follow up appointment  I would like to schedule this patient to be seen in clinic next week after the uptake and scan result is back. She is new diagnosed primary hyperthyroidism admitted for fatigue, weakness, headache, tachycardia and found to have primary hyperparathyroidism, likely Graves'. She was discharged later today with propranolol. Her primary team will call nuclear med for uptake and scan to see if they can do it tomorrow as outpatient. Thank you so much.      Derrell Garcia

## 2018-09-07 LAB
CMV DNA SPEC NAA+PROBE-ACNC: NORMAL [IU]/ML
CMV DNA SPEC NAA+PROBE-LOG#: NORMAL {LOG_IU}/ML
HADV DNA # SPEC NAA+PROBE: NORMAL COPIES/ML
HADV DNA SPEC NAA+PROBE-LOG#: NORMAL LOG COPIES/ML
SPECIMEN SOURCE: NORMAL
SPECIMEN SOURCE: NORMAL

## 2018-09-07 NOTE — DISCHARGE SUMMARY
[facility], [place]    Internal Medicine Discharge Summary- Gold Service    Date of Admission:  9/4/2018  Date of Discharge:  9/5/2018  7:08 PM  Discharging Provider: Kaylen Valadez  Discharge Team: Gold 4    Discharge Diagnoses   Hyperthyroidism  Concern for Graves' disease   Pancytopenia     Chronic   Migraine w/o aura       Follow-ups Needed After Discharge   Follow up for radio iodine uptake and scan on 9/6 and 9/7   Follow up with endocrinology next week     Hospital Course   Florentin Vega is a 23 year old female admitted on 9/4/2018. She has PMH of Migraine w/o aura who presents from OSH ED with 1 week h/o fatigue, dark stools, early satiety, chills, hot flashes and palpitations -lab findings consistent with hyperthyroidism, concern for graves' disease      #Hyperthyroidism:  TSH <0.01, FT4- 3.1, FT3- 229   Pending TSI on discharge   -discontinue atenolol which has more B1 selective effect and started on propanolol for peripheral effects ; at lower dose of 10 mg PO TID -increase to 20 mg PO TID in 2 days which was prescribed on discharge  -discussed with endocrinology- will need radio iodine uptake and scan and further follow up with endo for definitive management of hyperthyroidism ; will be scheduled and perform as an outpatient  -referral made for both to be done as an outpatient   --echo performed with no abnormality        #Pancytopenia:  -some rare case reports seen in setting of Graves' disease   -work up with different viruses and for destructive process came back negative   -negative EBV, CMV, adeno, haptoglobin, LDH     #Migraine w/o aura:  Aggravated likely in setting of hyperthyroidism   -excedrin as needed for migraine  # Discharge Pain Plan: - Patient currently has NO PAIN and is not being prescribed pain medications on discharge.    Consultations This Hospital Stay   ENDOCRINE NON-DIABETES ADULT IP CONSULT  VASCULAR ACCESS CARE ADULT IP CONSULT  VASCULAR ACCESS  CARE ADULT IP CONSULT  MEDICATION HISTORY IP PHARMACY CONSULT     Code Status   Full Code    Time Spent on this Encounter   I, Kaylen Valadez, personally saw the patient today and spent greater than 30 minutes discharging this patient.       Kaylen Valadez  Internal Medicine Staff Hospitalist Service  [facility]  Pager: [phone]  ______________________________________________________________________    Physical Exam   Vital Signs:                    Weight: 117 lbs 4.8 oz    General Appearance: Pt looks comfortable on exam   Respiratory: B/l equal breath sounds with no added sounds   Cardiovascular: s1s2 with no murmur   GI: soft, non tender, bowel sounds noted   Skin: no rash   Other: AAOx3, b/l UE and LE-5/5     Significant Results and Procedures   Most Recent 3 CBC's:  Recent Labs   Lab Test  09/05/18   0737  09/04/18   1618   WBC  2.7*  2.9*   HGB  12.4  12.5   MCV  79  78   PLT  125*  126*       Pending Results   These results will be followed up by   Unresulted Labs Ordered in the Past 30 Days of this Admission     Date and Time Order Name Status Description    9/5/2018 0821 Roseann Barr Virus Qualitative PCR In process     9/4/2018 1618 Thyroid stimulating immunoglobulin In process              Primary Care Physician   Physician No Ref-Primary    Discharge Disposition   Discharged to home  Condition at discharge: Stable    Discharge Orders     NM Thyroid uptake and scan   For further evaluation of hyperthyroidism thyroid uptake scan ; please schedule for 9/6/2018     Endocrinology Adult Referral     Reason for your hospital stay   Admitted with hyperthyroidism and endocrinology consult     Adult [facility] Follow-up and recommended labs and tests   Follow up with primary care provider, Physician No Ref-Primary, within 7 days to evaluate medication change. Follow up for CBC is recommended  Follow up with endocrinology for next week with result of radiodine uptake scan   Follow up for  radioiodine uptake scan on 9/6 and 9/7   Appointments on [place] and/or [facility] (with [facility] or [facility] provider or service). Call [phone] if you haven't heard regarding these appointments within 7 days of discharge.     Follow Up and recommended labs and tests   Follow up CBC in 1 week     Activity   Your activity upon discharge: activity as tolerated     Full Code     Diet   Follow this diet upon discharge: Orders Placed This Encounter     Regular Diet Adult       Discharge Medications   Discharge Medication List as of 9/5/2018  6:26 PM      START taking these medications    Details   propranolol (INDERAL) 10 MG tablet Take 1 tablet (10 mg) by mouth 3 times daily, Disp-60 tablet, R-0, E-PrescribeTake 10 mg oral three times a day for 2 days till 9/7/2018 and then increase to 20 mg PO three times a day until follow up with endocrinology         CONTINUE these medications which have NOT CHANGED    Details   acetaminophen (TYLENOL) 325 MG tablet Take 325-650 mg by mouth every 6 hours as needed for mild pain, Historical      ibuprofen (ADVIL/MOTRIN) 200 MG tablet Take 200-400 mg by mouth every 4 hours as needed for mild pain, Historical           Allergies   No Known Allergies

## 2018-09-08 LAB
EBV DNA SPEC QL NAA+PROBE: NOT DETECTED
SPECIMEN SOURCE: NORMAL
TSI SER-ACNC: 3.2 TSI INDEX

## 2018-09-11 ENCOUNTER — TELEPHONE (OUTPATIENT)
Dept: ENDOCRINOLOGY | Facility: CLINIC | Age: 23
End: 2018-09-11

## 2018-09-11 NOTE — TELEPHONE ENCOUNTER
Left patient a voicemail to call back to schedule appointments. This is my third attempt to contact her since discharge.

## 2021-08-14 ENCOUNTER — HEALTH MAINTENANCE LETTER (OUTPATIENT)
Age: 26
End: 2021-08-14

## 2021-10-10 ENCOUNTER — HEALTH MAINTENANCE LETTER (OUTPATIENT)
Age: 26
End: 2021-10-10

## 2022-09-18 ENCOUNTER — HEALTH MAINTENANCE LETTER (OUTPATIENT)
Age: 27
End: 2022-09-18

## 2023-10-08 ENCOUNTER — HEALTH MAINTENANCE LETTER (OUTPATIENT)
Age: 28
End: 2023-10-08

## 2024-10-21 ENCOUNTER — OFFICE VISIT (OUTPATIENT)
Dept: URGENT CARE | Facility: URGENT CARE | Age: 29
End: 2024-10-21
Payer: COMMERCIAL

## 2024-10-21 VITALS
SYSTOLIC BLOOD PRESSURE: 106 MMHG | TEMPERATURE: 97.5 F | HEART RATE: 112 BPM | BODY MASS INDEX: 25.94 KG/M2 | WEIGHT: 141.8 LBS | RESPIRATION RATE: 24 BRPM | OXYGEN SATURATION: 100 % | DIASTOLIC BLOOD PRESSURE: 72 MMHG

## 2024-10-21 DIAGNOSIS — J06.9 VIRAL URI WITH COUGH: Primary | ICD-10-CM

## 2024-10-21 PROCEDURE — 99203 OFFICE O/P NEW LOW 30 MIN: CPT

## 2024-10-21 RX ORDER — BENZONATATE 200 MG/1
200 CAPSULE ORAL 3 TIMES DAILY PRN
Qty: 21 CAPSULE | Refills: 0 | Status: SHIPPED | OUTPATIENT
Start: 2024-10-21

## 2024-10-21 NOTE — PATIENT INSTRUCTIONS
Get plenty of rest and drink fluids.  Can use Tylenol and/or ibuprofen as needed for pain and fever.  Maximum dose of Tylenol is 4000mg in a 24 hour period of time.  Take ibuprofen with food to avoid stomach upset.  Take the benzonatate as prescribed for the cough.

## 2024-10-21 NOTE — PROGRESS NOTES
ASSESSMENT:  (J06.9) Viral URI with cough  (primary encounter diagnosis)  Plan: benzonatate (TESSALON) 200 MG capsule    PLAN:  Informed the patient to get plenty rest, drink fluids and use Tylenol and/or ibuprofen as needed for pain and fever with the maximum dose of Tylenol being 4000 mg in a 24-hour period of time and to take ibuprofen with food to avoid upset stomach.  We discussed taking benzonatate as prescribed for the cough.  We also discussed returning to clinic with any new or worsening symptoms.  Patient acknowledged her understanding of the above plan.    The use of Dragon/Generic Mediaation services may have been used to construct the content in this note; any grammatical or spelling errors are non-intentional. Please contact the author of this note directly if you are in need of any clarification.      PASCALE Govea CNP      SUBJECTIVE:   Florentin Vega is a 29 year old female presenting with a chief complaint of chills, runny nose, stuffy nose, cough - productive, and ears feel plugged.  Onset of symptoms was 5 day(s) ago.  Course of illness is improving.    Patient denies: sore throat, vomiting, and diarrhea  Treatment measures tried include Tylenol and OTC Cough med.  Predisposing factors include ill contact: Family member .    The patient did not do an at home COVID test.     ROS:  Negative except noted above.    OBJECTIVE:  /72 (BP Location: Left arm, Patient Position: Sitting, Cuff Size: Adult Regular)   Pulse 112   Temp 97.5  F (36.4  C) (Tympanic)   Resp 24   Wt 64.3 kg (141 lb 12.8 oz)   LMP 10/15/2024 (Approximate)   SpO2 100%   BMI 25.94 kg/m    GENERAL APPEARANCE: healthy, alert and no distress  EYES: EOMI,  PERRL, conjunctiva clear  HENT: ear canals and TM's normal.  Nose and mouth without ulcers, erythema or lesions  NECK: supple, nontender, no lymphadenopathy  RESP: lungs clear to auscultation - no rales, rhonchi or wheezes  CV: regular rates and rhythm, normal  S1 S2, no murmur noted  SKIN: no suspicious lesions or rashes

## 2024-12-01 ENCOUNTER — HEALTH MAINTENANCE LETTER (OUTPATIENT)
Age: 29
End: 2024-12-01